# Patient Record
Sex: MALE | Race: WHITE | NOT HISPANIC OR LATINO | Employment: UNEMPLOYED | URBAN - METROPOLITAN AREA
[De-identification: names, ages, dates, MRNs, and addresses within clinical notes are randomized per-mention and may not be internally consistent; named-entity substitution may affect disease eponyms.]

---
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
## 2024-06-15 ENCOUNTER — HOSPITAL ENCOUNTER (EMERGENCY)
Facility: HOSPITAL | Age: 19
Discharge: HOME | End: 2024-06-15
Attending: EMERGENCY MEDICINE
Payer: COMMERCIAL

## 2024-06-15 ENCOUNTER — APPOINTMENT (OUTPATIENT)
Dept: GENERAL RADIOLOGY | Facility: HOSPITAL | Age: 19
End: 2024-06-15
Payer: COMMERCIAL

## 2024-06-15 VITALS
HEIGHT: 74 IN | TEMPERATURE: 98.4 F | DIASTOLIC BLOOD PRESSURE: 90 MMHG | SYSTOLIC BLOOD PRESSURE: 151 MMHG | WEIGHT: 315 LBS | HEART RATE: 101 BPM | RESPIRATION RATE: 19 BRPM | OXYGEN SATURATION: 99 % | BODY MASS INDEX: 40.43 KG/M2

## 2024-06-15 DIAGNOSIS — S93.402A SPRAIN OF LEFT ANKLE, UNSPECIFIED LIGAMENT, INITIAL ENCOUNTER: Primary | ICD-10-CM

## 2024-06-15 DIAGNOSIS — M25.562 ACUTE PAIN OF LEFT KNEE: ICD-10-CM

## 2024-06-15 PROCEDURE — 99283 EMERGENCY DEPT VISIT LOW MDM: CPT

## 2024-06-15 PROCEDURE — 73610 X-RAY EXAM OF ANKLE: CPT

## 2024-06-15 PROCEDURE — 73562 X-RAY EXAM OF KNEE 3: CPT

## 2024-06-15 NOTE — ED PROVIDER NOTES
Subjective   History of Present Illness  19-year-old male patient with chief complaint of left ankle pain.  Patient states around 9 AM he was walking down a steep hill.  Patient states he slipped and rolled his left ankle.  Patient admits to pain to his left ankle and left knee.  Left ankle and left knee pain is worse with movement and relieved with rest.  Denies hitting his head on the ground.  Denies headache, neck pain, chest pain, abdominal pain.  Denies pain to his upper or lower back.        Review of Systems   Musculoskeletal:  Positive for joint swelling.   All other systems reviewed and are negative.      Past Medical History:   Diagnosis Date    Asthma     Benign hypertension     Obesity (BMI 35.0-39.9 without comorbidity)     TBI (traumatic brain injury)        No Known Allergies    Past Surgical History:   Procedure Laterality Date    ADENOIDECTOMY      MOUTH SURGERY      TONSILLECTOMY         Family History   Problem Relation Age of Onset    No Known Problems Mother     No Known Problems Father        Social History     Socioeconomic History    Marital status: Single   Tobacco Use    Smoking status: Never    Smokeless tobacco: Never   Substance and Sexual Activity    Alcohol use: No    Drug use: No           Objective   Physical Exam  Vitals and nursing note reviewed.   Constitutional:       General: He is not in acute distress.     Appearance: Normal appearance.   HENT:      Head: Normocephalic and atraumatic.      Right Ear: Tympanic membrane normal.      Left Ear: Tympanic membrane normal.      Mouth/Throat:      Mouth: Mucous membranes are moist.   Eyes:      Extraocular Movements: Extraocular movements intact.      Conjunctiva/sclera: Conjunctivae normal.   Cardiovascular:      Rate and Rhythm: Normal rate.      Pulses: Normal pulses.      Heart sounds: Normal heart sounds.   Pulmonary:      Effort: Pulmonary effort is normal.      Breath sounds: Normal breath sounds.   Abdominal:      Palpations:  Abdomen is soft.      Tenderness: There is no abdominal tenderness. There is no guarding or rebound.   Musculoskeletal:         General: Tenderness (localized tenderness left lateral ankle.  Localized tenderness left lateral knee) present.      Cervical back: Normal range of motion.   Skin:     General: Skin is warm.   Neurological:      General: No focal deficit present.      Mental Status: He is alert.   Psychiatric:         Mood and Affect: Mood normal.         Procedures           ED Course                                             Medical Decision Making  Localized tenderness localized tenderness left lateral ankle.  Localized tenderness left lateral knee    Problems Addressed:  Sprain of left ankle, unspecified ligament, initial encounter: complicated acute illness or injury    Amount and/or Complexity of Data Reviewed  Radiology: ordered.     Details: XR Knee 3 View Left    Result Date: 6/15/2024  XR KNEE 3 VW LEFT Date of Exam: 6/15/2024 8:15 PM EDT Indication: left knee pain Comparison: None available. Findings: There is no acute fracture or dislocation.  Joint spaces appear normal.  No erosions or effusion. Soft tissues are unremarkable.     Impression: No acute osseous abnormality. Electronically Signed: Rahul Nowak MD  6/15/2024 8:20 PM EDT  Workstation ID: [id number]    XR Ankle 3+ View Left    Result Date: 6/15/2024  XR ANKLE 3+ VW LEFT Date of Exam: 6/15/2024 8:16 PM EDT Indication: Ankle pain. Comparison: None available. Findings: There is generalized soft tissue edema at the ankle most pronounced at the medial malleolus. There is no acute fracture or dislocation. No erosions. Joint spaces appear intact. The ankle mortise appears symmetric and the talar dome appears intact.     Impression: Soft tissue swelling without acute osseous abnormality. Electronically Signed: Rahul Nowak MD  6/15/2024 8:19 PM EDT  Workstation ID: [id number]    Would switch patient        Final diagnoses:   Sprain of left  ankle, unspecified ligament, initial encounter   Acute pain of left knee       ED Disposition  ED Disposition       ED Disposition   Discharge    Condition   Stable    Comment   --               PATIENT CONNECTION - [place] [id number]  [phone]  In 2 days           Medication List      No changes were made to your prescriptions during this visit.            Rey Choudhury DO  06/15/24 1939       Rey Choudhury DO  06/15/24 2039

## 2024-06-16 NOTE — DISCHARGE INSTRUCTIONS
Follow-up with your primary care doctor as soon as possible.  Return for any worsening conditions.  If you continue to have knee pain discuss with your doctor to obtain MRI of your knee to make sure there is no ligament or cartilage injury

## 2024-06-27 ENCOUNTER — OFFICE VISIT (OUTPATIENT)
Dept: ORTHOPEDIC SURGERY | Facility: CLINIC | Age: 19
End: 2024-06-27
Payer: COMMERCIAL

## 2024-06-27 VITALS
HEART RATE: 97 BPM | WEIGHT: 315 LBS | HEIGHT: 74 IN | BODY MASS INDEX: 40.43 KG/M2 | DIASTOLIC BLOOD PRESSURE: 89 MMHG | SYSTOLIC BLOOD PRESSURE: 137 MMHG

## 2024-06-27 DIAGNOSIS — M25.562 MECHANICAL KNEE PAIN, LEFT: ICD-10-CM

## 2024-06-27 DIAGNOSIS — S82.839A NONDISPLACED FRACTURE OF DISTAL END OF FIBULA: ICD-10-CM

## 2024-06-27 DIAGNOSIS — M25.572 ACUTE LEFT ANKLE PAIN: Primary | ICD-10-CM

## 2024-06-27 PROCEDURE — 99203 OFFICE O/P NEW LOW 30 MIN: CPT | Performed by: NURSE PRACTITIONER

## 2024-06-27 PROCEDURE — 73600 X-RAY EXAM OF ANKLE: CPT | Performed by: NURSE PRACTITIONER

## 2024-06-27 RX ORDER — AMLODIPINE BESYLATE 10 MG/1
TABLET ORAL
COMMUNITY

## 2024-06-27 RX ORDER — IBUPROFEN 600 MG/1
600 TABLET ORAL EVERY 8 HOURS PRN
Qty: 90 TABLET | Refills: 1 | Status: SHIPPED | OUTPATIENT
Start: 2024-06-27

## 2024-06-27 NOTE — PROGRESS NOTES
Subjective:     Patient ID: Kenn Abel is a 19 y.o. male.    Chief Complaint:  Left knee pain, new patient to examiner  Left ankle pain  History of Present Illness  History of Present Illness     Kenn Abel Presents to clinic today for evaluation of his left lower extremity.  He presents to clinic with mom with new onset of pain at the left lower extremity including the knee and the ankle.  He was ambulating on 6/15/2024 to evaluate chickens when he slid the right lower extremity extended out in front and the left lower extremity flexed back.  He rates discomfort a 7-8 out of a 10 felt and heard a pop at the right lower extremity.  He is experiencing pain along the lateral compartment of the knee and also in the lateral aspect of the ankle.  He was seen at ER x-ray images were completed which are available for review in chart.  Prior x-ray imaging completed which are available for review.  Describes pain as throbbing, aching, dull, stabbing.  He was fitted with a fracture boot encouraged to follow-up with orthopedics if he continues to experience pain.  His mom did present yesterday to clinic was discussing injury and opted to proceed with orthopedic evaluation.  He does have upcoming mission trip leaves around January 13.  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  Denies any prior injury to the knee in the past denies any prior injury to the ankle in the past.  Currently utilizing fracture boot weightbearing.  He has attempted to ambulate out of the fracture boot but due to the swelling and increased pain at the left lower extremity is unable to do so.  In the does feel stable when he is in fracture boot.  Has difficulty with the knee feeling like it is going to give way without the fracture boot.  Denies any other concerns present.    Social History     Occupational History    Not on file   Tobacco Use    Smoking status: Never     Passive exposure: Never     "Smokeless tobacco: Never   Substance and Sexual Activity    Alcohol use: No    Drug use: No    Sexual activity: Not on file      Past Medical History:   Diagnosis Date    Asthma     Benign hypertension     Obesity (BMI 35.0-39.9 without comorbidity)     TBI (traumatic brain injury)      Past Surgical History:   Procedure Laterality Date    ADENOIDECTOMY      MOUTH SURGERY      TONSILLECTOMY         Family History   Problem Relation Age of Onset    No Known Problems Mother     No Known Problems Father                Objective:  Physical Exam    Vital signs reviewed.   General: No acute distress.  Eyes: conjunctiva clear; pupils equally round and reactive  ENT: external ears and nose atraumatic; oropharynx clear  CV: no peripheral edema  Resp: normal respiratory effort  Skin: no rashes or wounds; normal turgor  Psych: mood and affect appropriate; recent and remote memory intact    Vitals:    06/27/24 1147   BP: 137/89   Pulse: 97   Weight: (!) 147 kg (323 lb)   Height: 188 cm (74\")         06/27/24  1147   Weight: (!) 147 kg (323 lb)     Body mass index is 41.47 kg/m².      Left Ankle Exam     Tenderness   The patient is experiencing tenderness in the ATF.   Swelling: moderate    Range of Motion   Dorsiflexion:  25   Plantar flexion:  45     Muscle Strength   Dorsiflexion:  4/5   Plantar flexion:  4/5     Other   Erythema: absent  Sensation: normal  Pulse: present    Comments:  Positive squeeze test with pain along the syndesmosis, distal fibula      Left Knee Exam     Tenderness   The patient is experiencing tenderness in the lateral joint line and LCL.    Range of Motion   Extension:  0   Flexion:  120     Tests   Malika:  Medial - negative   Varus: positive   Lachman:  Anterior - positive        Other   Erythema: absent  Sensation: normal  Pulse: present  Swelling: moderate             Physical Exam      Imaging:    XR Knee 3 View Left    Result Date: 6/15/2024  Impression: No acute osseous abnormality. " Electronically Signed: Rahul Nowak MD  6/15/2024 8:20 PM EDT  Workstation ID: [id number]  Independently reviewed x-ray imaging 2 view left knee concerns for avulsion injury along the LCL no imaging available for comparison    XR Ankle 3+ View Left    Result Date: 6/15/2024  Impression: Soft tissue swelling without acute osseous abnormality. Electronically Signed: Rahul Nowak MD  6/15/2024 8:19 PM EDT  Workstation ID: [id number]    Left Ankle X-Ray  Indication: Pain  Views: AP, Lateral, Mortise  Findings:  Nondisplaced fracture fibular shaft, distally  No bony lesion  Soft tissues normal  Normal joint spaces    prior studies available for comparison.    Assessment:        1. Acute left ankle pain    2. Mechanical knee pain, left    3. Nondisplaced fracture of distal end of fibula         Assessment & Plan      Plan:  1.  Discussed plan of care with patient.  We did review x-ray imaging.  We will proceed with MRI to evaluate ligament tear left knee.  We will start ibuprofen 600 mg every 8 hours as needed for moderate to severe pain.  Continue fracture boot for now.  I do recommend active range of motion, ice, rest, elevation.  I will see him back in clinic around July 8 that week with x-ray images to be repeated of the ankle, left at follow-up.  Will likely proceed with Dry-Vivek hinged brace for the knee at that time plan to have MRI before follow-up.  All questions answered.  Orders:  Orders Placed This Encounter   Procedures    XR Ankle 2 View Left    MRI Knee Left Without Contrast     New Medications Ordered This Visit   Medications    ibuprofen (ADVIL,MOTRIN) 600 MG tablet     Sig: Take 1 tablet by mouth Every 8 (Eight) Hours As Needed for Mild Pain.     Dispense:  90 tablet     Refill:  1         Dragon dictation utilized  We encourage all our patients to maintain a healthy weight - a Body Mass Index of 20-25. This, along with regular exercise and a balanced diet can lower your risk of many illnesses such as  diabetes, heart disease, and stroke.

## 2024-07-05 ENCOUNTER — PATIENT ROUNDING (BHMG ONLY) (OUTPATIENT)
Dept: ORTHOPEDIC SURGERY | Facility: CLINIC | Age: 19
End: 2024-07-05
Payer: COMMERCIAL

## 2024-07-05 NOTE — PROGRESS NOTES
A My-Chart message has been sent to the patient for PATIENT ROUNDING with [facility].

## 2024-07-08 ENCOUNTER — OFFICE VISIT (OUTPATIENT)
Dept: ORTHOPEDIC SURGERY | Facility: CLINIC | Age: 19
End: 2024-07-08
Payer: COMMERCIAL

## 2024-07-08 VITALS — HEIGHT: 74 IN | WEIGHT: 315 LBS | BODY MASS INDEX: 40.43 KG/M2

## 2024-07-08 DIAGNOSIS — M25.572 ACUTE LEFT ANKLE PAIN: Primary | ICD-10-CM

## 2024-07-08 DIAGNOSIS — S82.839A NONDISPLACED FRACTURE OF DISTAL END OF FIBULA: ICD-10-CM

## 2024-07-08 DIAGNOSIS — M25.562 MECHANICAL KNEE PAIN, LEFT: ICD-10-CM

## 2024-07-08 NOTE — PROGRESS NOTES
Subjective:     Patient ID: Kenn Abel is a 19 y.o. male.    Chief Complaint:  Follow-up left distal fibula fracture  Mechanical knee pain - MRI completed  History of Present Illness  History of Present Illness    Kenn Abel returns to clinic today with mom for follow-up of his knee and ankle.  He continues to experience swelling at the left lower extremity.  He is weightbearing as tolerated had to discontinue the boot due to the swelling and increased pain he is experiencing while in fracture boot.  He has continued weightbearing as tolerated maximal tenderness continues to be present along the mid to distal fibula with swelling along the lateral malleolus only.  Continues to experience pain at the knee along the lateral compartment and anterior aspect of the knee.  Denies of the knee is locking, catching he does note stiffness with activities involving deep flexion throbbing in nature.  Denies any other concerns present.    Social History     Occupational History    Not on file   Tobacco Use    Smoking status: Never     Passive exposure: Never    Smokeless tobacco: Never   Vaping Use    Vaping status: Never Used   Substance and Sexual Activity    Alcohol use: No    Drug use: No    Sexual activity: Defer      Past Medical History:   Diagnosis Date    Asthma     Benign hypertension     Obesity (BMI 35.0-39.9 without comorbidity)     TBI (traumatic brain injury)      Past Surgical History:   Procedure Laterality Date    ADENOIDECTOMY      MOUTH SURGERY      TONSILLECTOMY         Family History   Problem Relation Age of Onset    No Known Problems Mother     No Known Problems Father                Objective:  Physical Exam  General: No acute distress.  Eyes: conjunctiva clear; pupils equally round and reactive  ENT: external ears and nose atraumatic; oropharynx clear  CV: no peripheral edema  Resp: normal respiratory effort  Skin: no rashes or wounds; normal turgor  Psych: mood and affect appropriate;  "recent and remote memory intact    Vitals:    07/08/24 1535   Weight: (!) 147 kg (323 lb)   Height: 188 cm (74\")         07/08/24  1535   Weight: (!) 147 kg (323 lb)     Body mass index is 41.47 kg/m².      Left Ankle Exam   Swelling: moderate    Muscle Strength   Dorsiflexion:  5/5   Plantar flexion:  5/5     Tests   Anterior drawer: negative  Varus tilt: negative    Other   Erythema: absent  Sensation: normal  Pulse: present    Comments:  Maximal tenderness present distal fibula      Left Knee Exam     Tenderness   The patient is experiencing tenderness in the patella and lateral joint line.    Range of Motion   Extension:  0   Left knee flexion: 125.     Tests   Malika:  Medial - negative Lateral - negative  Varus: negative Valgus: negative  Lachman:  Anterior - 1+      Drawer:  Anterior - negative         Other   Erythema: absent  Sensation: normal  Pulse: present  Swelling: mild  Effusion: no effusion present             Physical Exam      Imaging:          Evidently reviewed MRI completed outside facility partial tear of PCL no evidence of ACL or cruciate ligament abnormality.  Shallow trochlear groove borderline increased tibial tubercle trochlear groove measuring 17 mm no evidence of meniscal tear.    Left Ankle X-Ray  Indication: Pain  Views: AP, Lateral, Mortise  Findings:  Nondisplaced fracture fibula shaft continues to heal  No bony lesion  Soft tissues normal  Normal joint spaces    prior studies available for comparison.    Assessment:        1. Acute left ankle pain    2. Mechanical knee pain, left    3. Nondisplaced fracture of distal end of fibula         Assessment & Plan      Plan:  Discussed plan of care with patient and mom.  We did discuss options including PT versus home PT exercises.  Will proceed with home PT exercises quad strengthening hamstring strengthening which were provided today's visit.  Discussed discontinue the fracture boot will continue to experience swelling due to the " fracture of the distal third shaft of the fibula this will improve over time.  He can continue weightbearing as tolerated.  Will plan to release him back to all previously tolerated activities plan to see him back in clinic as needed.  All questions answered.  Orders:  Orders Placed This Encounter   Procedures    XR Ankle 3+ View Left     No orders of the defined types were placed in this encounter.        Dragon dictation utilized